# Patient Record
Sex: MALE | Race: OTHER | HISPANIC OR LATINO | URBAN - METROPOLITAN AREA
[De-identification: names, ages, dates, MRNs, and addresses within clinical notes are randomized per-mention and may not be internally consistent; named-entity substitution may affect disease eponyms.]

---

## 2020-06-26 ENCOUNTER — OFFICE (OUTPATIENT)
Dept: URBAN - METROPOLITAN AREA TELEHEALTH 7 | Facility: TELEHEALTH | Age: 20
End: 2020-06-26
Payer: COMMERCIAL

## 2020-06-26 VITALS — HEIGHT: 69 IN | WEIGHT: 195 LBS

## 2020-06-26 DIAGNOSIS — R11.2 NAUSEA WITH VOMITING, UNSPECIFIED: ICD-10-CM

## 2020-06-26 DIAGNOSIS — R10.32 LEFT LOWER QUADRANT PAIN: ICD-10-CM

## 2020-06-26 DIAGNOSIS — K21.9 GASTRO-ESOPHAGEAL REFLUX DISEASE WITHOUT ESOPHAGITIS: ICD-10-CM

## 2020-06-26 DIAGNOSIS — R19.7 DIARRHEA, UNSPECIFIED: ICD-10-CM

## 2020-06-26 PROCEDURE — 99243 OFF/OP CNSLTJ NEW/EST LOW 30: CPT | Performed by: INTERNAL MEDICINE

## 2020-06-26 NOTE — SERVICEHPINOTES
PATIENT VERIFIED BY DATE OF BIRTH AND NAME. Patient has been consented for this telecommunication visit. He has been noting LLQ pain over the 4 past weeks, non radiating. He has been waking up in the morning with heartburn/acid taste, LLQ pain, nausea, vomiting. He has also been having diarrhea which wakes him up during the night. He will have less than 5 BMs in a day but most of it is in the morning. He has no bright red blood per rectum or melena. He has no appetite during the day and only eats once a day. No significant weight loss.  He notes sweats/chills on some mornings. No documented fever. He had an unremarkable CT scan. Liver enzymes were normal. COVID testing negative. No sick contacts. No recent antibiotics. No stool testing. Symptoms seemed to start after food from [employer]. He has been trying acid suppression and states that famotidine seems to be helping the reflux.  However the nausea is frequent, worsened by smelling food during the day as well as sunlight. He constantly feels dehydrated despite drinking a lot of water. He has no prior GI issues. No family history of GI issues.

## 2020-06-26 NOTE — SERVICENOTES
Patient's visit was conducted through video telecommunication. Patient consented before the start of visit as to understanding of privacy concerns, possible technological failure, and their responsibility of carrying out instructions of plan.

Patient understands and agrees with plan. Questions/concerns addressed

## 2020-07-08 ENCOUNTER — TELEHEALTH PROVIDED OTHER THAN IN PATIENT'S HOME (OUTPATIENT)
Dept: URBAN - METROPOLITAN AREA TELEHEALTH 7 | Facility: TELEHEALTH | Age: 20
End: 2020-07-08

## 2020-07-08 VITALS — WEIGHT: 186 LBS | HEIGHT: 69 IN

## 2020-07-08 DIAGNOSIS — R19.7 DIARRHEA, UNSPECIFIED: ICD-10-CM

## 2020-07-08 DIAGNOSIS — R10.32 LEFT LOWER QUADRANT PAIN: ICD-10-CM

## 2020-07-08 DIAGNOSIS — R11.2 NAUSEA WITH VOMITING, UNSPECIFIED: ICD-10-CM

## 2020-07-08 PROCEDURE — 99214 OFFICE O/P EST MOD 30 MIN: CPT | Mod: 95 | Performed by: PHYSICIAN ASSISTANT

## 2020-07-08 NOTE — SERVICEHPINOTES
PATIENT VERIFIED BY DATE OF BIRTH AND NAME. Patient has been consented for this telecommunication visit.20 yo male presents with ongoing N/V/D and abdominal pain. He started having symptoms after eating some food from [employer] in mid-May. He was to the ER recently without specific diagnosis and a prior CT was negative. He has multiple bouts of diarrhea mostly in the mornings. Has frequent nausea and vomiting and has been losing weight now within the past week. No blood in stools, no fever, no family h/o IBD. He last smoked pot about a month ago and did smoke it more often in the past but he has stopped. No recent abx use. He is on Carafate and Prilosec but didn't like the effects of the anti-nausea medication. He submitted a Biofire stool test (ordered at ER?) 2 days ago which is still pending. Already saw Dr. Woody on June 26th who ordered the original Biofire which apparently wasn't done. ROS per above, otherwise negative.

## 2020-07-22 ENCOUNTER — OFFICE (OUTPATIENT)
Dept: URBAN - METROPOLITAN AREA CLINIC 34 | Facility: CLINIC | Age: 20
End: 2020-07-22
Payer: COMMERCIAL

## 2020-07-22 DIAGNOSIS — Z11.59 ENCOUNTER FOR SCREENING FOR OTHER VIRAL DISEASES: ICD-10-CM

## 2020-07-22 PROCEDURE — 99211 OFF/OP EST MAY X REQ PHY/QHP: CPT | Mod: CS,25 | Performed by: INTERNAL MEDICINE

## 2020-07-22 PROCEDURE — 99211 OFF/OP EST MAY X REQ PHY/QHP: CPT | Mod: 25,CS | Performed by: INTERNAL MEDICINE

## 2020-07-24 ENCOUNTER — OFFICE (OUTPATIENT)
Dept: URBAN - METROPOLITAN AREA CLINIC 98 | Facility: CLINIC | Age: 20
End: 2020-07-24

## 2020-07-24 ENCOUNTER — OFFICE (OUTPATIENT)
Dept: URBAN - METROPOLITAN AREA PATHOLOGY 18 | Facility: PATHOLOGY | Age: 20
End: 2020-07-24

## 2020-07-24 VITALS
DIASTOLIC BLOOD PRESSURE: 74 MMHG | TEMPERATURE: 97.5 F | RESPIRATION RATE: 12 BRPM | TEMPERATURE: 97.7 F | RESPIRATION RATE: 11 BRPM | SYSTOLIC BLOOD PRESSURE: 118 MMHG | SYSTOLIC BLOOD PRESSURE: 113 MMHG | SYSTOLIC BLOOD PRESSURE: 107 MMHG | DIASTOLIC BLOOD PRESSURE: 82 MMHG | DIASTOLIC BLOOD PRESSURE: 62 MMHG | SYSTOLIC BLOOD PRESSURE: 134 MMHG | OXYGEN SATURATION: 99 % | HEART RATE: 67 BPM | HEART RATE: 78 BPM | DIASTOLIC BLOOD PRESSURE: 66 MMHG | WEIGHT: 186 LBS | OXYGEN SATURATION: 98 % | RESPIRATION RATE: 16 BRPM | RESPIRATION RATE: 14 BRPM | HEART RATE: 56 BPM | HEART RATE: 62 BPM | HEIGHT: 69 IN | SYSTOLIC BLOOD PRESSURE: 112 MMHG | OXYGEN SATURATION: 97 %

## 2020-07-24 DIAGNOSIS — K29.60 OTHER GASTRITIS WITHOUT BLEEDING: ICD-10-CM

## 2020-07-24 DIAGNOSIS — R19.7 DIARRHEA, UNSPECIFIED: ICD-10-CM

## 2020-07-24 DIAGNOSIS — R11.2 NAUSEA WITH VOMITING, UNSPECIFIED: ICD-10-CM

## 2020-07-24 PROBLEM — K31.89 OTHER DISEASES OF STOMACH AND DUODENUM: Status: ACTIVE | Noted: 2020-07-24

## 2020-07-24 PROCEDURE — 88305 TISSUE EXAM BY PATHOLOGIST: CPT | Performed by: PATHOLOGY

## 2020-07-24 PROCEDURE — 88313 SPECIAL STAINS GROUP 2: CPT | Performed by: PATHOLOGY

## 2020-07-24 PROCEDURE — 88342 IMHCHEM/IMCYTCHM 1ST ANTB: CPT | Performed by: PATHOLOGY

## 2020-08-20 ENCOUNTER — TELEHEALTH PROVIDED OTHER THAN IN PATIENT'S HOME (OUTPATIENT)
Dept: URBAN - METROPOLITAN AREA TELEHEALTH 7 | Facility: TELEHEALTH | Age: 20
End: 2020-08-20

## 2020-08-20 VITALS — WEIGHT: 180 LBS | HEIGHT: 69 IN

## 2020-08-20 DIAGNOSIS — R11.2 NAUSEA WITH VOMITING, UNSPECIFIED: ICD-10-CM

## 2020-08-20 DIAGNOSIS — R19.7 DIARRHEA, UNSPECIFIED: ICD-10-CM

## 2020-08-20 PROCEDURE — 99213 OFFICE O/P EST LOW 20 MIN: CPT | Mod: 95 | Performed by: PHYSICIAN ASSISTANT

## 2020-08-20 NOTE — SERVICEHPINOTES
PATIENT VERIFIED BY DATE OF BIRTH AND NAME. Patient has been consented for this telecommunication visit. 21 yo male presents for f/u N/V/D and abdominal pain. He notes that he is feeling much better. His recent EGD showed mild gastritis only. His Biofire was negative. No longer having N/V. He stopped his omeprazole as this seemed to be making some symptoms worse. He does still have some diarrhea in the mornings. He has early satiety especially with high fat foods and is therefore doing a lot of smoothies. He has lost a total of 20-30 pounds, seems to be primarily related to reduced PO intake. He denies any fevers, blood or mucous in stools. Feels nearly back to normal. No other concerns today.Prior hx: He started having symptoms after eating some food from [employer] in mid-May. He went to the ER without specific diagnosis and a prior CT was negative. He continued to have symptoms through early July but started getting better within the past month. ROS per above, otherwise negative.